# Patient Record
Sex: MALE | Race: WHITE | Employment: UNEMPLOYED | URBAN - METROPOLITAN AREA
[De-identification: names, ages, dates, MRNs, and addresses within clinical notes are randomized per-mention and may not be internally consistent; named-entity substitution may affect disease eponyms.]

---

## 2018-02-14 ENCOUNTER — TRANSFERRED RECORDS (OUTPATIENT)
Dept: HEALTH INFORMATION MANAGEMENT | Facility: CLINIC | Age: 23
End: 2018-02-14

## 2018-03-01 DIAGNOSIS — R94.5 ABNORMAL RESULTS OF LIVER FUNCTION STUDIES: Primary | ICD-10-CM

## 2018-03-06 ENCOUNTER — HOSPITAL ENCOUNTER (OUTPATIENT)
Facility: AMBULATORY SURGERY CENTER | Age: 23
End: 2018-03-06
Attending: INTERNAL MEDICINE
Payer: COMMERCIAL

## 2018-03-06 ENCOUNTER — OFFICE VISIT (OUTPATIENT)
Dept: GASTROENTEROLOGY | Facility: CLINIC | Age: 23
End: 2018-03-06
Attending: INTERNAL MEDICINE
Payer: COMMERCIAL

## 2018-03-06 VITALS
SYSTOLIC BLOOD PRESSURE: 139 MMHG | WEIGHT: 274.2 LBS | BODY MASS INDEX: 37.14 KG/M2 | HEART RATE: 77 BPM | DIASTOLIC BLOOD PRESSURE: 73 MMHG | TEMPERATURE: 98.4 F | HEIGHT: 72 IN

## 2018-03-06 DIAGNOSIS — R74.8 ELEVATED LIVER ENZYMES: Primary | ICD-10-CM

## 2018-03-06 DIAGNOSIS — R10.30 LOWER ABDOMINAL PAIN: ICD-10-CM

## 2018-03-06 DIAGNOSIS — R94.5 ABNORMAL RESULTS OF LIVER FUNCTION STUDIES: ICD-10-CM

## 2018-03-06 LAB
ALBUMIN SERPL-MCNC: 4.3 G/DL (ref 3.4–5)
ALP SERPL-CCNC: 76 U/L (ref 40–150)
ALT SERPL W P-5'-P-CCNC: 194 U/L (ref 0–70)
ANION GAP SERPL CALCULATED.3IONS-SCNC: 5 MMOL/L (ref 3–14)
AST SERPL W P-5'-P-CCNC: 97 U/L (ref 0–45)
BILIRUB DIRECT SERPL-MCNC: 0.1 MG/DL (ref 0–0.2)
BILIRUB SERPL-MCNC: 0.4 MG/DL (ref 0.2–1.3)
BUN SERPL-MCNC: 10 MG/DL (ref 7–30)
CALCIUM SERPL-MCNC: 9.6 MG/DL (ref 8.5–10.1)
CHLORIDE SERPL-SCNC: 104 MMOL/L (ref 94–109)
CO2 SERPL-SCNC: 29 MMOL/L (ref 20–32)
CREAT SERPL-MCNC: 0.89 MG/DL (ref 0.66–1.25)
ERYTHROCYTE [DISTWIDTH] IN BLOOD BY AUTOMATED COUNT: 12.6 % (ref 10–15)
GFR SERPL CREATININE-BSD FRML MDRD: >90 ML/MIN/1.7M2
GLUCOSE SERPL-MCNC: 96 MG/DL (ref 70–99)
HCT VFR BLD AUTO: 48.1 % (ref 40–53)
HGB BLD-MCNC: 16 G/DL (ref 13.3–17.7)
INR PPP: 0.99 (ref 0.86–1.14)
MCH RBC QN AUTO: 28.9 PG (ref 26.5–33)
MCHC RBC AUTO-ENTMCNC: 33.3 G/DL (ref 31.5–36.5)
MCV RBC AUTO: 87 FL (ref 78–100)
PLATELET # BLD AUTO: 255 10E9/L (ref 150–450)
POTASSIUM SERPL-SCNC: 4.3 MMOL/L (ref 3.4–5.3)
PROT SERPL-MCNC: 7.9 G/DL (ref 6.8–8.8)
RBC # BLD AUTO: 5.54 10E12/L (ref 4.4–5.9)
SODIUM SERPL-SCNC: 138 MMOL/L (ref 133–144)
WBC # BLD AUTO: 9.6 10E9/L (ref 4–11)

## 2018-03-06 PROCEDURE — 85610 PROTHROMBIN TIME: CPT | Performed by: INTERNAL MEDICINE

## 2018-03-06 PROCEDURE — 85027 COMPLETE CBC AUTOMATED: CPT | Performed by: INTERNAL MEDICINE

## 2018-03-06 PROCEDURE — 80048 BASIC METABOLIC PNL TOTAL CA: CPT | Performed by: INTERNAL MEDICINE

## 2018-03-06 PROCEDURE — G0463 HOSPITAL OUTPT CLINIC VISIT: HCPCS | Mod: ZF

## 2018-03-06 PROCEDURE — 80076 HEPATIC FUNCTION PANEL: CPT | Performed by: INTERNAL MEDICINE

## 2018-03-06 PROCEDURE — 36415 COLL VENOUS BLD VENIPUNCTURE: CPT | Performed by: INTERNAL MEDICINE

## 2018-03-06 ASSESSMENT — PAIN SCALES - GENERAL: PAINLEVEL: NO PAIN (0)

## 2018-03-06 NOTE — NURSING NOTE
"Chief Complaint   Patient presents with     Consult     consult with elevated lab results, tzimmer cma       Initial /73  Pulse 77  Temp 98.4  F (36.9  C) (Oral)  Ht 1.822 m (5' 11.75\")  Wt 124.4 kg (274 lb 3.2 oz)  BMI 37.45 kg/m2 Estimated body mass index is 37.45 kg/(m^2) as calculated from the following:    Height as of this encounter: 1.822 m (5' 11.75\").    Weight as of this encounter: 124.4 kg (274 lb 3.2 oz).  Medication Reconciliation: complete    "

## 2018-03-06 NOTE — LETTER
"3/6/2018       RE: Amari Disla  [address] [facility] [address]     Dear Colleague,    Thank you for referring your patient, Amari Disla, to the [facility] HEPATOLOGY at [facility]. Please see a copy of my visit note below.    Hepatology Clinic note  Amari Disla   Date of Birth 1995  Date of Service 3/6/2018  Reason for consult: Chronically elevated liver enzymes  Requesting provider: Dr. Carl Rueda, [facility]          Impression and plan:   Amari Disla is a 22 year old male with history of chronically elevated liver enzymes in a hepatocellular pattern, longstanding bowel symptoms including alternating bowel habits and abdominal pain.    He presents with his mother for evaluation of his liver enzymes abnormalities.      His enzyme elevation is in a hepatocellular pattern, but does have a normal synthetic function.    No clinical signs of advanced liver disease or portal hypertension.      Given the duration of these abnormalities, as well as the family history of Crohn's disease, we are suspecting an autoimmune etiology as the explanation of his abdominal symptoms and liver abnormalities.  Of note, he had a positive aldolase level in the past, as well as the unusual oral thrush recently.      - Will evaluate him for causes of chronic liver disease including autoimmune entities such as autoimmune hepatitis, PBC or PSC.   - Checking ceruloplasmin, iron panel, immunoglobulins, SPEP, celiac serologies and viral serologies.   - Ordered MRI, MRCP for better evaluation of the liver parenchyma and biliary tree.  Of note, his prior imaging was suggestive of \"fatty infiltration.\"   - Depending on the results of the above, we may obtain a liver biopsy for definitive diagnosis and evaluation of fibrosis given the chronicity.   - Would also suggest a repeat colonoscopy and an EGD with biopsies, as he continues to have abdominal symptoms.  His last colonoscopy " was in 2012.      Return to clinic in 3 months or sooner as needed    Lise Boss MD  [facility] Hepat[facility]    -----------------------------------------------------       HPI:   Amari Disla is a 22 year old male history of chronic abdominal symptoms labeled as irritable bowel syndrome, cholecystectomy and appendectomy in 2012 and chronically elevated liver enzymes, who presents with his mother to establish care.      Briefly, Amari reports a longstanding history of alternating bowel habits, predominantly loose stool on a daily basis associated with lower pelvic cramps and discomfort which are relieved with defecation.  This is typically worse about 30 minutes after a meal.  He denies any hematochezia or melena.      Had a colonoscopy in 2012 after a CT scan showed abnormal appearance of the terminal ileum, colonoscopy was reportedly normal.  Biopsies from the terminal ileum, cecum and left and right-sided colon where normal without pathological alterations.   In addition, his mother reports a longstanding history of different symptoms including intermittent fevers, facial flushing and some weakness.  More recently, he was started on rifaximin empirically for small intestinal bacterial overgrowth, and he reports significant improvement after a 10-day course of the medication.      His mother notes  numerous infections as a child related to sinusitis and middle ear infections requiring multiple rounds of antibiotics.  However, his symptoms improved after he had tube placement.      In 12/2017, he was diagnosed with oral thrush which responded to Nystatin treatment.  There was no clear explantation as to why he developed thrush.  Blood tests at that time showed rising liver enzymes, predominantly hepatocellular.      Amari denies significant alcohol use, no over-the-counter medications, herbs or supplements.  He notes stable weight and appetite.  He is not on any prescribed  "medications and only takes probiotics.      He has a paternal uncle with Crohn's disease.  Maternal side is significant for an uncle with multiple myeloma, a cousin with leukemia, another uncle with testicular cancer as well as diabetes.          Past Medical History:     Past Medical History:   Diagnosis Date     Abdominal pain      Anxiety             Past Surgical History:     Past Surgical History:   Procedure Laterality Date     APPENDECTOMY       CHOLECYSTECTOMY       PE TUBES       TONSILLECTOMY              Medications:     Current Outpatient Prescriptions   Medication     Lactobacillus (PROBIOTIC ACIDOPHILUS PO)     No current facility-administered medications for this visit.           Allergies:   No Known Allergies         Social History:   reports that he is a non-smoker but has been exposed to tobacco smoke. He has never used smokeless tobacco. He reports that he does not drink alcohol or use illicit drugs.   has no sexual activity history on file.         Family History:   Paternal uncle with Crohn's disease.   Maternal side is significant for an uncle with multiple myeloma, a cousin with leukemia, another uncle with testicular cancer as well as diabetes.          Review of Systems:   10 points ROS was obtained and highlighted in the HPI, otherwise negative.          Physical Exam:   VS:  /73  Pulse 77  Temp 98.4  F (36.9  C) (Oral)  Ht 1.822 m (5' 11.75\")  Wt 124.4 kg (274 lb 3.2 oz)  BMI 37.45 kg/m2      Gen: A&Ox3, NAD  HEENT: non-icteric, oropharynx clear  CV: RRR  Lung: CTAB  Abd: soft, NT, ND  Ext: no edema, intact pulses.   Skin: no stigmata of chronic liver disease.   Neuro: no focal deficits, grossly intact, no asterixis   Psych: appropriate mood and affects       Data:   Reviewed in person and significant for:  Lab Results   Component Value Date    WBC 9.6 03/06/2018     Lab Results   Component Value Date    RBC 5.54 03/06/2018     Lab Results   Component Value Date    HGB 16.0 " 03/06/2018     Lab Results   Component Value Date    HCT 48.1 03/06/2018     No components found for: MCT  Lab Results   Component Value Date    MCV 87 03/06/2018     Lab Results   Component Value Date    MCH 28.9 03/06/2018     Lab Results   Component Value Date    MCHC 33.3 03/06/2018     Lab Results   Component Value Date    RDW 12.6 03/06/2018     Lab Results   Component Value Date     03/06/2018     Last Basic Metabolic Panel:  Lab Results   Component Value Date     03/06/2018      Lab Results   Component Value Date    POTASSIUM 4.3 03/06/2018     Lab Results   Component Value Date    CHLORIDE 104 03/06/2018     Lab Results   Component Value Date    STEVE 9.6 03/06/2018     Lab Results   Component Value Date    CO2 29 03/06/2018     Lab Results   Component Value Date    BUN 10 03/06/2018     Lab Results   Component Value Date    CR 0.89 03/06/2018     Lab Results   Component Value Date    GLC 96 03/06/2018     Liver Function Studies -   Recent Labs   Lab Test  03/06/18   0717   PROTTOTAL  7.9   ALBUMIN  4.3   BILITOTAL  0.4   ALKPHOS  76   AST  97*   ALT  194*   Again, thank you for allowing me to participate in the care of your patient.    Lise Boss MD

## 2018-03-06 NOTE — MR AVS SNAPSHOT
After Visit Summary   3/6/2018    Amari Disla    MRN: [id number]           Patient Information     Date Of Birth          1995        Visit Information        Provider Department      3/6/2018 9:00 AM Lise Boss MD [facility] Hepatology        Today's Diagnoses     Elevated liver enzymes    -  1    Lower abdominal pain           Follow-ups after your visit        Additional Services     GASTROENTEROLOGY ADULT REF PROCEDURE ONLY       Last Lab Result: Creatinine (mg/dL)       Date                     Value                 03/06/2018               0.89             ----------  Body mass index is 37.45 kg/(m^2).     Needed:  No  Language:  English    Patient will be contacted to schedule procedure.     Please be aware that coverage of these services is subject to the terms and limitations of your health insurance plan.  Call member services at your health plan with any benefit or coverage questions.  Any procedures must be performed at a [facility] facility OR coordinated by your clinic's referral office.    Please bring the following with you to your appointment:    (1) Any X-Rays, CTs or MRIs which have been performed.  Contact the facility where they were done to arrange for  prior to your scheduled appointment.    (2) List of current medications   (3) This referral request   (4) Any documents/labs given to you for this referral                  Your next 10 appointments already scheduled     Mar 28, 2018   Procedure with Lise Boss MD   [facility] ([facility])    [address] [place], [place] [id number] [phone]           Located in the Clinics and Surgery Center at [address] [place] [address].   parking is very convenient and highly recommended.  is a $6 flat rate fee.  Both  and self parkers should enter the main arrival plaza from [facility]; parking attendants will  "direct you based on your parking preference.            Jun 11, 2018  2:30 PM CDT   Lab with [facility]   [facility] ([facility])    [address]  1st Floor  [facility] [address] [phone]            Jun 11, 2018  3:30 PM CDT   (Arrive by 3:15 PM)   Return General Liver with Lise Boss MD   [facility] ([facility])    [address]  [address]  [facility] [address] [phone]              Who to contact     If you have questions or need follow up information about today's clinic visit or your schedule please contact [facility] directly at [phone].  Normal or non-critical lab and imaging results will be communicated to you by MyChart, letter or phone within 4 business days after the clinic has received the results. If you do not hear from us within 7 days, please contact the clinic through Tel[employer] or phone. If you have a critical or abnormal lab result, we will notify you by phone as soon as possible.  Submit refill requests through Ch[employer]time or call your pharmacy and they will forward the refill request to us. Please allow 3 business days for your refill to be completed.          Additional Information About Your Visit        Chilltime Information     Ch[employer]time gives you secure access to your electronic health record. If you see a primary care provider, you can also send messages to your care team and make appointments. If you have questions, please call your primary care clinic.  If you do not have a primary care provider, please call [phone] and they will assist you.        Care EveryWhere ID     This is your Care EveryWhere ID. This could be used by other organizations to access your [place] medical records  [id number]        Your Vitals Were     Pulse Temperature Height BMI (Body Mass Index)          77 98.4  F (36.9  C) (Oral) 1.822 m (5' 11.75\") 37.45 kg/m2         Blood Pressure from Last 3 Encounters: "   03/06/18 139/73   12/26/15 111/72   03/11/10 106/58    Weight from Last 3 Encounters:   03/06/18 124.4 kg (274 lb 3.2 oz)   03/11/10 55.3 kg (122 lb) (84 %)*   02/02/10 54 kg (119 lb) (82 %)*     * Growth percentiles are based on [facility] 2-20 Years data.              We Performed the Following     GASTROENTEROLOGY ADULT REF PROCEDURE ONLY          Today's Medication Changes          These changes are accurate as of 3/6/18 11:59 PM.  If you have any questions, ask your nurse or doctor.               Stop taking these medicines if you haven't already. Please contact your care team if you have questions.     SERTRALINE HCL PO   Stopped by:  Lise Boss MD                    Primary Care Provider Office Phone # Fax #    [facility] [phone]       [facility] [address] [place] [id number]        Equal Access to Services     [facility]: Hadii ronny ku hadasho Soomaali, waaxda luqadaha, qaybta kaalmada adeegyada, brady sher . So [facility] [phone].    ATENCIÓN: Si habla español, tiene a banda disposición servicios gratuitos de asistencia lingüística. Llame al [phone].    We comply with applicable federal civil rights laws and [place] laws. We do not discriminate on the basis of race, color, national origin, age, disability, sex, sexual orientation, or gender identity.            Thank you!     Thank you for choosing [facility]PATOLOG[facility]  for your care. Our goal is always to provide you with excellent care. Hearing back from our patients is one way we can continue to improve our services. Please take a few minutes to complete the written survey that you may receive in the mail after your visit with us. Thank you!             Your Updated Medication List - Protect others around you: Learn how to safely use, store and throw away your medicines at www.disposemymeds.org.          This list is accurate as of 3/6/18 11:59 PM.  Always use your most  recent med list.                   Brand Name Dispense Instructions for use Diagnosis    PROBIOTIC ACIDOPHILUS PO      Take by mouth daily

## 2018-03-06 NOTE — PROGRESS NOTES
"Hepatology Clinic note  Amari Disla   Date of Birth 1995  Date of Service 3/6/2018  Reason for consult: Chronically elevated liver enzymes  Requesting provider: Dr. Carl Rueda, [facility]          Impression and plan:   Amari Disla is a 22 year old male with history of chronically elevated liver enzymes in a hepatocellular pattern, longstanding bowel symptoms including alternating bowel habits and abdominal pain.    He presents with his mother for evaluation of his liver enzymes abnormalities.      His enzyme elevation is in a hepatocellular pattern, but does have a normal synthetic function.    No clinical signs of advanced liver disease or portal hypertension.      Given the duration of these abnormalities, as well as the family history of Crohn's disease, we are suspecting an autoimmune etiology as the explanation of his abdominal symptoms and liver abnormalities.  Of note, he had a positive aldolase level in the past, as well as the unusual oral thrush recently.      - Will evaluate him for causes of chronic liver disease including autoimmune entities such as autoimmune hepatitis, PBC or PSC.   - Checking ceruloplasmin, iron panel, immunoglobulins, SPEP, celiac serologies and viral serologies.   - Ordered MRI, MRCP for better evaluation of the liver parenchyma and biliary tree.  Of note, his prior imaging was suggestive of \"fatty infiltration.\"   - Depending on the results of the above, we may obtain a liver biopsy for definitive diagnosis and evaluation of fibrosis given the chronicity.   - Would also suggest a repeat colonoscopy and an EGD with biopsies, as he continues to have abdominal symptoms.  His last colonoscopy was in 2012.      Return to clinic in 3 months or sooner as needed    Lise Boss MD  [facility] Hepatology clinic    -----------------------------------------------------       HPI:   Amari Disla is a 22 year old male history of chronic abdominal " symptoms labeled as irritable bowel syndrome, cholecystectomy and appendectomy in 2012 and chronically elevated liver enzymes, who presents with his mother to establish care.      Briefly, Amari reports a longstanding history of alternating bowel habits, predominantly loose stool on a daily basis associated with lower pelvic cramps and discomfort which are relieved with defecation.  This is typically worse about 30 minutes after a meal.  He denies any hematochezia or melena.      Had a colonoscopy in 2012 after a CT scan showed abnormal appearance of the terminal ileum, colonoscopy was reportedly normal.  Biopsies from the terminal ileum, cecum and left and right-sided colon where normal without pathological alterations.   In addition, his mother reports a longstanding history of different symptoms including intermittent fevers, facial flushing and some weakness.  More recently, he was started on rifaximin empirically for small intestinal bacterial overgrowth, and he reports significant improvement after a 10-day course of the medication.      His mother notes  numerous infections as a child related to sinusitis and middle ear infections requiring multiple rounds of antibiotics.  However, his symptoms improved after he had tube placement.      In 12/2017, he was diagnosed with oral thrush which responded to Nystatin treatment.  There was no clear explantation as to why he developed thrush.  Blood tests at that time showed rising liver enzymes, predominantly hepatocellular.      Amari denies significant alcohol use, no over-the-counter medications, herbs or supplements.  He notes stable weight and appetite.  He is not on any prescribed medications and only takes probiotics.      He has a paternal uncle with Crohn's disease.  Maternal side is significant for an uncle with multiple myeloma, a cousin with leukemia, another uncle with testicular cancer as well as diabetes.          Past Medical History:     Past  "Medical History:   Diagnosis Date     Abdominal pain      Anxiety             Past Surgical History:     Past Surgical History:   Procedure Laterality Date     APPENDECTOMY       CHOLECYSTECTOMY       PE TUBES       TONSILLECTOMY              Medications:     Current Outpatient Prescriptions   Medication     Lactobacillus (PROBIOTIC ACIDOPHILUS PO)     No current facility-administered medications for this visit.             Allergies:   No Known Allergies         Social History:      reports that he is a non-smoker but has been exposed to tobacco smoke. He has never used smokeless tobacco. He reports that he does not drink alcohol or use illicit drugs.   has no sexual activity history on file.           Family History:   Paternal uncle with Crohn's disease.   Maternal side is significant for an uncle with multiple myeloma, a cousin with leukemia, another uncle with testicular cancer as well as diabetes.          Review of Systems:   10 points ROS was obtained and highlighted in the HPI, otherwise negative.          Physical Exam:   VS:  /73  Pulse 77  Temp 98.4  F (36.9  C) (Oral)  Ht 1.822 m (5' 11.75\")  Wt 124.4 kg (274 lb 3.2 oz)  BMI 37.45 kg/m2      Gen: A&Ox3, NAD  HEENT: non-icteric, oropharynx clear  CV: RRR  Lung: CTAB  Abd: soft, NT, ND  Ext: no edema, intact pulses.   Skin: no stigmata of chronic liver disease.   Neuro: no focal deficits, grossly intact, no asterixis   Psych: appropriate mood and affects       Data:   Reviewed in person and significant for:  Lab Results   Component Value Date    WBC 9.6 03/06/2018     Lab Results   Component Value Date    RBC 5.54 03/06/2018     Lab Results   Component Value Date    HGB 16.0 03/06/2018     Lab Results   Component Value Date    HCT 48.1 03/06/2018     No components found for: MCT  Lab Results   Component Value Date    MCV 87 03/06/2018     Lab Results   Component Value Date    MCH 28.9 03/06/2018     Lab Results   Component Value Date    MCHC " 33.3 03/06/2018     Lab Results   Component Value Date    RDW 12.6 03/06/2018     Lab Results   Component Value Date     03/06/2018     Last Basic Metabolic Panel:  Lab Results   Component Value Date     03/06/2018      Lab Results   Component Value Date    POTASSIUM 4.3 03/06/2018     Lab Results   Component Value Date    CHLORIDE 104 03/06/2018     Lab Results   Component Value Date    STEVE 9.6 03/06/2018     Lab Results   Component Value Date    CO2 29 03/06/2018     Lab Results   Component Value Date    BUN 10 03/06/2018     Lab Results   Component Value Date    CR 0.89 03/06/2018     Lab Results   Component Value Date    GLC 96 03/06/2018     Liver Function Studies -   Recent Labs   Lab Test  03/06/18   0717   PROTTOTAL  7.9   ALBUMIN  4.3   BILITOTAL  0.4   ALKPHOS  76   AST  97*   ALT  194*

## 2018-03-08 ENCOUNTER — TELEPHONE (OUTPATIENT)
Dept: GASTROENTEROLOGY | Facility: CLINIC | Age: 23
End: 2018-03-08

## 2018-03-08 NOTE — TELEPHONE ENCOUNTER
Writer spoke to pt. Pr prefers to have the MRI and lab studies(faxed lab orders to [phone]; [phone]), performed @ the [facility]. Scheduled MRI/MRCP for tomorrow @ 7:15am check in, NPO 4 hours prior. Faxed order to [phone]; [phone].  EGD/colonoscopy is scheduled @ the [facility]. Pt was able to confirm all that was needed. Transferred pt to appt scheduling.

## 2018-03-08 NOTE — TELEPHONE ENCOUNTER
----- Message from Lise Boss MD sent at 3/6/2018 12:06 PM CST -----  Shaggy Montes,   I saw Amari this morning. I discharged him before placing orders because I needed to review his outside workup and history.     Would you please update him that I recommend the following at his convenience (time and location):  - blood, urine and stool samples. Tests ordered.   - MRI/MRCP, ordered.  - colonoscopy, and EGD. I did not mention the EGD to him earlier, but I believe it would be helpful to check at the time of colonoscopy.   Both are ordered.   - depending on results of all of the above, we may or may not need a liver biopsy, study tuned.     - I suggest he sets up a return appointment with me in 3-4 months (needs to schedule it).    Thank you

## 2018-03-09 ENCOUNTER — TRANSFERRED RECORDS (OUTPATIENT)
Dept: HEALTH INFORMATION MANAGEMENT | Facility: CLINIC | Age: 23
End: 2018-03-09

## 2018-03-09 DIAGNOSIS — R10.30 LOWER ABDOMINAL PAIN: ICD-10-CM

## 2018-03-09 DIAGNOSIS — R74.8 ELEVATED LIVER ENZYMES: ICD-10-CM

## 2018-03-09 LAB
ALBUMIN UR-MCNC: NEGATIVE MG/DL
APPEARANCE UR: CLEAR
BILIRUB UR QL STRIP: NEGATIVE
COLOR UR AUTO: YELLOW
CRP SERPL-MCNC: 3.9 MG/L (ref 0–8)
ERYTHROCYTE [SEDIMENTATION RATE] IN BLOOD BY WESTERGREN METHOD: 7 MM/H (ref 0–15)
FERRITIN SERPL-MCNC: 96 NG/ML (ref 26–388)
GLUCOSE UR STRIP-MCNC: NEGATIVE MG/DL
HGB UR QL STRIP: NEGATIVE
IGA SERPL-MCNC: 72 MG/DL (ref 70–380)
IGG SERPL-MCNC: 1020 MG/DL (ref 695–1620)
IGM SERPL-MCNC: 64 MG/DL (ref 60–265)
IRON SATN MFR SERPL: 19 % (ref 15–46)
IRON SERPL-MCNC: 84 UG/DL (ref 35–180)
KETONES UR STRIP-MCNC: NEGATIVE MG/DL
LEUKOCYTE ESTERASE UR QL STRIP: NEGATIVE
NITRATE UR QL: NEGATIVE
PH UR STRIP: 5.5 PH (ref 5–7)
RBC #/AREA URNS AUTO: NORMAL /HPF
SOURCE: NORMAL
SP GR UR STRIP: 1.02 (ref 1–1.03)
TIBC SERPL-MCNC: 439 UG/DL (ref 240–430)
UROBILINOGEN UR STRIP-MCNC: NORMAL MG/DL (ref 0–2)
WBC #/AREA URNS AUTO: NORMAL /HPF

## 2018-03-09 PROCEDURE — 82390 ASSAY OF CERULOPLASMIN: CPT | Performed by: INTERNAL MEDICINE

## 2018-03-09 PROCEDURE — 83550 IRON BINDING TEST: CPT | Performed by: INTERNAL MEDICINE

## 2018-03-09 PROCEDURE — 36415 COLL VENOUS BLD VENIPUNCTURE: CPT | Performed by: INTERNAL MEDICINE

## 2018-03-09 PROCEDURE — 84165 PROTEIN E-PHORESIS SERUM: CPT | Performed by: INTERNAL MEDICINE

## 2018-03-09 PROCEDURE — 00000402 ZZHCL STATISTIC TOTAL PROTEIN: Performed by: INTERNAL MEDICINE

## 2018-03-09 PROCEDURE — 81001 URINALYSIS AUTO W/SCOPE: CPT | Performed by: INTERNAL MEDICINE

## 2018-03-09 PROCEDURE — 99000 SPECIMEN HANDLING OFFICE-LAB: CPT | Performed by: INTERNAL MEDICINE

## 2018-03-09 PROCEDURE — 83516 IMMUNOASSAY NONANTIBODY: CPT | Mod: 90 | Performed by: INTERNAL MEDICINE

## 2018-03-09 PROCEDURE — 82103 ALPHA-1-ANTITRYPSIN TOTAL: CPT | Performed by: INTERNAL MEDICINE

## 2018-03-09 PROCEDURE — 83993 ASSAY FOR CALPROTECTIN FECAL: CPT | Performed by: INTERNAL MEDICINE

## 2018-03-09 PROCEDURE — 82784 ASSAY IGA/IGD/IGG/IGM EACH: CPT | Performed by: INTERNAL MEDICINE

## 2018-03-09 PROCEDURE — 83516 IMMUNOASSAY NONANTIBODY: CPT | Mod: 59 | Performed by: INTERNAL MEDICINE

## 2018-03-09 PROCEDURE — 82728 ASSAY OF FERRITIN: CPT | Performed by: INTERNAL MEDICINE

## 2018-03-09 PROCEDURE — 86140 C-REACTIVE PROTEIN: CPT | Performed by: INTERNAL MEDICINE

## 2018-03-09 PROCEDURE — 83540 ASSAY OF IRON: CPT | Performed by: INTERNAL MEDICINE

## 2018-03-09 PROCEDURE — 85652 RBC SED RATE AUTOMATED: CPT | Performed by: INTERNAL MEDICINE

## 2018-03-09 PROCEDURE — 82085 ASSAY OF ALDOLASE: CPT | Mod: 90 | Performed by: INTERNAL MEDICINE

## 2018-03-10 ENCOUNTER — HEALTH MAINTENANCE LETTER (OUTPATIENT)
Age: 23
End: 2018-03-10

## 2018-03-10 LAB
ALDOLASE SERPL-CCNC: 8.2 U/L (ref 1.5–8.1)
MITOCHONDRIA M2 IGG SER-ACNC: 1 U/ML
TTG IGA SER-ACNC: <1 U/ML
TTG IGG SER-ACNC: <1 U/ML

## 2018-03-11 LAB — SMA IGG SER-ACNC: 8 UNITS (ref 0–19)

## 2018-03-12 LAB
A1AT SERPL-MCNC: 153 MG/DL (ref 80–200)
ALBUMIN SERPL ELPH-MCNC: 4.7 G/DL (ref 3.7–5.1)
ALPHA1 GLOB SERPL ELPH-MCNC: 0.3 G/DL (ref 0.2–0.4)
ALPHA2 GLOB SERPL ELPH-MCNC: 0.8 G/DL (ref 0.5–0.9)
B-GLOBULIN SERPL ELPH-MCNC: 0.9 G/DL (ref 0.6–1)
CERULOPLASMIN SERPL-MCNC: 34 MG/DL (ref 23–53)
GAMMA GLOB SERPL ELPH-MCNC: 1 G/DL (ref 0.7–1.6)
M PROTEIN SERPL ELPH-MCNC: 0 G/DL
PROT PATTERN SERPL ELPH-IMP: NORMAL

## 2018-03-13 LAB — CALPROTECTIN STL-MCNT: <27 MG/KG (ref 0–49.9)

## 2018-03-21 ENCOUNTER — TELEPHONE (OUTPATIENT)
Dept: GASTROENTEROLOGY | Facility: CLINIC | Age: 23
End: 2018-03-21

## 2018-03-23 ENCOUNTER — TELEPHONE (OUTPATIENT)
Dept: GASTROENTEROLOGY | Facility: CLINIC | Age: 23
End: 2018-03-23

## 2018-03-23 DIAGNOSIS — Z12.11 ENCOUNTER FOR SCREENING COLONOSCOPY: Primary | ICD-10-CM

## 2018-03-23 NOTE — TELEPHONE ENCOUNTER
Patient scheduled for Colonoscopy and EGD    Indication for procedure. Lower abdominal pain     Referring Provider. Lise Boss MD    ? No    Arrival time verified? Patient instructed to arrive at 1100 am.    Facility location verified? [facility], [address] [place] [address] 5th floor.     Instructions given regarding prep and procedure. Transportation policy and prep reviewed; RN answered all questions.     Prep Type Golytely    Are you taking any anticoagulants or blood thinners? Denies     Instructions given? Yes, verbalized understanding     Electronic implanted devices? Denies     Pre procedure teaching completed? Yes    Transportation from procedure? Mother with drive and stay with patient.     H&P / Pre op physical completed? N/A    Omar Hager RN

## 2018-04-06 ENCOUNTER — TELEPHONE (OUTPATIENT)
Dept: GASTROENTEROLOGY | Facility: CLINIC | Age: 23
End: 2018-04-06

## 2018-04-06 DIAGNOSIS — Z12.11 ENCOUNTER FOR SCREENING COLONOSCOPY: Primary | ICD-10-CM

## 2018-04-12 ENCOUNTER — ANESTHESIA (OUTPATIENT)
Dept: GASTROENTEROLOGY | Facility: CLINIC | Age: 23
End: 2018-04-12
Payer: COMMERCIAL

## 2018-04-12 ENCOUNTER — ANESTHESIA EVENT (OUTPATIENT)
Dept: GASTROENTEROLOGY | Facility: CLINIC | Age: 23
End: 2018-04-12
Payer: COMMERCIAL

## 2018-04-12 ENCOUNTER — SURGERY (OUTPATIENT)
Age: 23
End: 2018-04-12

## 2018-04-12 ENCOUNTER — HOSPITAL ENCOUNTER (OUTPATIENT)
Facility: CLINIC | Age: 23
Discharge: HOME | End: 2018-04-12
Attending: INTERNAL MEDICINE | Admitting: INTERNAL MEDICINE
Payer: COMMERCIAL

## 2018-04-12 VITALS
WEIGHT: 277 LBS | BODY MASS INDEX: 38.78 KG/M2 | OXYGEN SATURATION: 96 % | HEART RATE: 95 BPM | DIASTOLIC BLOOD PRESSURE: 85 MMHG | SYSTOLIC BLOOD PRESSURE: 132 MMHG | RESPIRATION RATE: 15 BRPM | HEIGHT: 71 IN

## 2018-04-12 LAB
COLONOSCOPY: NORMAL
UPPER GI ENDOSCOPY: NORMAL

## 2018-04-12 PROCEDURE — 25000128 H RX IP 250 OP 636: Performed by: NURSE ANESTHETIST, CERTIFIED REGISTERED

## 2018-04-12 PROCEDURE — 40000141 ZZH STATISTIC PERIPHERAL IV START W/O US GUIDANCE

## 2018-04-12 PROCEDURE — 88305 TISSUE EXAM BY PATHOLOGIST: CPT | Performed by: INTERNAL MEDICINE

## 2018-04-12 PROCEDURE — 37000009 ZZH ANESTHESIA TECHNICAL FEE, EACH ADDTL 15 MIN: Performed by: INTERNAL MEDICINE

## 2018-04-12 PROCEDURE — 37000008 ZZH ANESTHESIA TECHNICAL FEE, 1ST 30 MIN: Performed by: INTERNAL MEDICINE

## 2018-04-12 PROCEDURE — 25000125 ZZHC RX 250: Performed by: NURSE ANESTHETIST, CERTIFIED REGISTERED

## 2018-04-12 PROCEDURE — 43239 EGD BIOPSY SINGLE/MULTIPLE: CPT | Performed by: INTERNAL MEDICINE

## 2018-04-12 PROCEDURE — 45380 COLONOSCOPY AND BIOPSY: CPT | Performed by: INTERNAL MEDICINE

## 2018-04-12 RX ORDER — PROPOFOL 10 MG/ML
INJECTION, EMULSION INTRAVENOUS PRN
Status: DISCONTINUED | OUTPATIENT
Start: 2018-04-12 | End: 2018-04-12

## 2018-04-12 RX ORDER — FENTANYL CITRATE 50 UG/ML
25-50 INJECTION, SOLUTION INTRAMUSCULAR; INTRAVENOUS
Status: CANCELLED | OUTPATIENT
Start: 2018-04-12

## 2018-04-12 RX ORDER — ONDANSETRON 4 MG/1
4 TABLET, ORALLY DISINTEGRATING ORAL EVERY 30 MIN PRN
Status: CANCELLED | OUTPATIENT
Start: 2018-04-12

## 2018-04-12 RX ORDER — PROPOFOL 10 MG/ML
INJECTION, EMULSION INTRAVENOUS CONTINUOUS PRN
Status: DISCONTINUED | OUTPATIENT
Start: 2018-04-12 | End: 2018-04-12

## 2018-04-12 RX ORDER — NALOXONE HYDROCHLORIDE 0.4 MG/ML
.1-.4 INJECTION, SOLUTION INTRAMUSCULAR; INTRAVENOUS; SUBCUTANEOUS
Status: CANCELLED | OUTPATIENT
Start: 2018-04-12 | End: 2018-04-13

## 2018-04-12 RX ORDER — ONDANSETRON 2 MG/ML
4 INJECTION INTRAMUSCULAR; INTRAVENOUS EVERY 30 MIN PRN
Status: CANCELLED | OUTPATIENT
Start: 2018-04-12

## 2018-04-12 RX ORDER — SODIUM CHLORIDE, SODIUM LACTATE, POTASSIUM CHLORIDE, CALCIUM CHLORIDE 600; 310; 30; 20 MG/100ML; MG/100ML; MG/100ML; MG/100ML
INJECTION, SOLUTION INTRAVENOUS CONTINUOUS PRN
Status: DISCONTINUED | OUTPATIENT
Start: 2018-04-12 | End: 2018-04-12

## 2018-04-12 RX ORDER — FENTANYL CITRATE 50 UG/ML
INJECTION, SOLUTION INTRAMUSCULAR; INTRAVENOUS PRN
Status: DISCONTINUED | OUTPATIENT
Start: 2018-04-12 | End: 2018-04-12

## 2018-04-12 RX ORDER — SODIUM CHLORIDE, SODIUM LACTATE, POTASSIUM CHLORIDE, CALCIUM CHLORIDE 600; 310; 30; 20 MG/100ML; MG/100ML; MG/100ML; MG/100ML
INJECTION, SOLUTION INTRAVENOUS CONTINUOUS
Status: CANCELLED | OUTPATIENT
Start: 2018-04-12

## 2018-04-12 RX ADMIN — PROPOFOL 100 MCG/KG/MIN: 10 INJECTION, EMULSION INTRAVENOUS at 14:01

## 2018-04-12 RX ADMIN — PROPOFOL 50 MG: 10 INJECTION, EMULSION INTRAVENOUS at 14:09

## 2018-04-12 RX ADMIN — MIDAZOLAM 1 MG: 1 INJECTION INTRAMUSCULAR; INTRAVENOUS at 13:56

## 2018-04-12 RX ADMIN — LIDOCAINE HYDROCHLORIDE 5 ML: 20 SOLUTION ORAL; TOPICAL at 13:56

## 2018-04-12 RX ADMIN — PROPOFOL 30 MG: 10 INJECTION, EMULSION INTRAVENOUS at 14:25

## 2018-04-12 RX ADMIN — SODIUM CHLORIDE, POTASSIUM CHLORIDE, SODIUM LACTATE AND CALCIUM CHLORIDE: 600; 310; 30; 20 INJECTION, SOLUTION INTRAVENOUS at 14:01

## 2018-04-12 RX ADMIN — BENZOCAINE 1 EACH: 220 SPRAY, METERED PERIODONTAL at 13:56

## 2018-04-12 RX ADMIN — FENTANYL CITRATE 50 MCG: 50 INJECTION, SOLUTION INTRAMUSCULAR; INTRAVENOUS at 14:05

## 2018-04-12 RX ADMIN — MIDAZOLAM 1 MG: 1 INJECTION INTRAMUSCULAR; INTRAVENOUS at 14:01

## 2018-04-12 NOTE — IP AVS SNAPSHOT
[facility], [place], Endoscopy    [address] [address]    Phone:  [phone]                                       After Visit Summary   4/12/2018    Amari Disla    MRN: [id number]           After Visit Summary Signature Page     I have received my discharge instructions, and my questions have been answered. I have discussed any challenges I see with this plan with the nurse or doctor.    ..........................................................................................................................................  Patient/Patient Representative Signature      ..........................................................................................................................................  Patient Representative Print Name and Relationship to Patient    ..................................................               ................................................  Date                                            Time    ..........................................................................................................................................  Reviewed by Signature/Title    ...................................................              ..............................................  Date                                                            Time

## 2018-04-12 NOTE — LETTER
4/17/2018     Amari Disla  [address] [facility] [address]      Dear Amari:    I received the pathology report from the biopsies taken at your recent endoscopies.    The small bowel/duodenum biopsies showed some mild inflammation related to acid from the stomach.    The biopsies taken from the small bowel/ileum and the entirety of the large bowel were all normal.  There is no evidence of cancer or Crohn's disease.    For the inflammation seen on the duodenum biopsies, you should take a course of acid suppression medication (omeprazole).  I will send a prescription of this to your local pharmacy.    You should follow-up with Dr Boss in the office as scheduled.    Sincerely,          Clifton Juan MD  [facility], [place], ENDOSCOPY  [address] [facility] [address]  Phone: [phone]

## 2018-04-12 NOTE — ANESTHESIA PREPROCEDURE EVALUATION
Anesthesia Evaluation     .             ROS/MED HX    ENT/Pulmonary:  - neg pulmonary ROS     Neurologic:  - neg neurologic ROS     Cardiovascular:  - neg cardiovascular ROS       METS/Exercise Tolerance:     Hematologic:  - neg hematologic  ROS       Musculoskeletal:  - neg musculoskeletal ROS       GI/Hepatic: Comment: Abdominal pain  Elevated liver enzymes        Renal/Genitourinary:  - ROS Renal section negative       Endo:  - neg endo ROS       Psychiatric:     (+) psychiatric history depression and anxiety      Infectious Disease:  - neg infectious disease ROS       Malignancy:         Other:                     Physical Exam  Normal systems: cardiovascular, pulmonary and dental    Airway   Mallampati: I  TM distance: >3 FB  Neck ROM: full    Dental     Cardiovascular   Rhythm and rate: regular and normal      Pulmonary    breath sounds clear to auscultation                    Anesthesia Plan      History & Physical Review  History and physical reviewed and following examination; no interval change.    ASA Status:  1 .    NPO Status:  > 8 hours    Plan for MAC with Intravenous and Propofol induction. Maintenance will be TIVA.  Reason for MAC:  Deep or markedly invasive procedure (G8)  PONV prophylaxis:  Ondansetron (or other 5HT-3) and Dexamethasone or Solumedrol       Postoperative Care  Postoperative pain management:  IV analgesics.      Consents  Anesthetic plan, risks, benefits and alternatives discussed with:  Patient..

## 2018-04-12 NOTE — ANESTHESIA CARE TRANSFER NOTE
Patient: Amari Disla    Procedure(s):   - Wound Class: II-Clean Contaminated   - Wound Class: II-Clean Contaminated    Diagnosis: Colon & Egd with Mahgoub/DX:Lower abdominal pain /Golytely & egd preps mailed  Diagnosis Additional Information: No value filed.    Anesthesia Type:   MAC     Note:  Airway :Room Air  Patient transferred to:Phase II  Comments: Pt to recovery room.  Spontaneous respirations.   Report given to RN.  Handoff Report: Identifed the Patient, Identified the Reponsible Provider, Reviewed the pertinent medical history, Discussed the surgical course, Reviewed Intra-OP anesthesia mangement and issues during anesthesia, Set expectations for post-procedure period and Allowed opportunity for questions and acknowledgement of understanding      Vitals: (Last set prior to Anesthesia Care Transfer)    CRNA VITALS  4/12/2018 1415 - 4/12/2018 1515      4/12/2018             Pulse: 84    Ht Rate: 90    SpO2: 95 %    Resp Rate (observed): 16    EKG: Sinus rhythm                Electronically Signed By: SURYA Vincent CRNA  April 12, 2018  3:21 PM

## 2018-04-12 NOTE — IP AVS SNAPSHOT
MRN:[id number]                      After Visit Summary   4/12/2018    Amari Disla    MRN: [id number]           Thank you!     Thank you for choosing [facility] for your care. Our goal is always to provide you with excellent care. Hearing back from our patients is one way we can continue to improve our services. Please take a few minutes to complete the written survey that you may receive in the mail after you visit with us. Thank you!        Patient Information     Date Of Birth          1995        About your hospital stay     You were admitted on:  April 12, 2018 You last received care in the:  [facility], Endoscopy    You were discharged on:  April 12, 2018       Who to Call     For medical emergencies, please call 911.  For non-urgent questions about your medical care, please call your primary care provider or clinic, [phone]  For questions related to your surgery, please call your surgery clinic        Attending Provider     Provider Specialty    Clifton Chaparro MD Gastroenterology       Primary Care Provider Office Phone # Fax #    [facility] [phone]      Your next 10 appointments already scheduled     Jun 11, 2018  2:30 PM CDT   Lab with  LAB   [facility] ([facility])    [address]  1st Floor  [facility] [address] [phone]            Jun 11, 2018  3:30 PM CDT   (Arrive by 3:15 PM)   Return General Liver with Lise Boss MD   [facility] Hepatology ([facility])    [address]  [address]  [facility] [address] [phone]              Further instructions from your care team       Discharge Instructions after Colonoscopy  or Sigmoidoscopy    Today you had a __x__ Colonoscopy     Activity and Diet  You were given medicine for pain. You may be dizzy or sleepy.  For 24 hours:    Do not drive or use heavy equipment.    Do not make important decisions.    Do  not drink any alcohol.  You may return to your normal diet and medicines.    Discomfort    Air was placed in your colon during the exam in order to see it. Walking helps to pass the air.    You may take Tylenol (acetaminophen) for pain unless your doctor has told you not to.    Follow-up  ___x_ We took small tissue samples or polyps to study. Your doctor will call you with the results  within two weeks.    When to call:    Call right away if you have:    Unusual pain in belly or chest pain not relieved with passing air.    More than 1 to 2 Tablespoons of bleeding from your rectum.    Fever above 100.6  F (37.5  C).    If you have severe pain, bleeding, or shortness of breath, go to an emergency room.    If you have questions, call:  Monday to Friday, 7 a.m. to 4:30 p.m.  Endoscopy: [phone] (We may have to call you back)    After hours  Hospital: [phone] (Ask for the GI fellow on call)Discharge Instructions after  Upper Endoscopy (EGD)    Activity and Diet  You were given medicine for pain. You may be dizzy or sleepy.  For 24 hours:    Do not drive or use heavy equipment.    Do not make important decisions.    Do not drink any alcohol.  _x__ You may return to your regular diet.    Discomfort  You may have a sore throat for 2 to 3 days. It may help to:    Avoid hot liquids for 24 hours.    Use sore throat lozenges.    Gargle as needed with salt water up to 4 times a day. Mix 1 cup of warm water  with 1 teaspoon of salt. Do not swallow..    You may take Tylenol (acetaminophen) for pain unless your doctor has told you not to.      Follow-up  ___ We took small tissue samples for study. If you do not have a follow-up visit scheduled,  call your provider s office in 2 weeks for the results.    Other instructions________________________________________________________    When to call us:  Problems are rare. Call right away if you have:    Unusual throat pain or trouble swallowing    Unusual pain in belly or chest  "that is not relieved by belching or passing air    Black stools (tar-like looking bowel movement)    Temperature above 100.6  F. (37.5  C).    If you vomit blood or have severe pain, go to an emergency room.    If you have questions, call:  Monday to Friday, 7 a.m. to 4:30 p.m.: Endoscopy: [phone] (We may have to call you back)    After hours: Hospital: [phone] (Ask for the GI fellow on call)    Pending Results     No orders found from 4/10/2018 to 4/13/2018.            Admission Information     Date & Time Provider Department Dept. Phone    4/12/2018 Clifton Chaparro MD [facility], [place], Endoscopy [phone]      Your Vitals Were     Blood Pressure Pulse Respirations Height Weight Pulse Oximetry    122/82 95 25 1.803 m (5' 11\") 125.6 kg (277 lb) 94%    BMI (Body Mass Index)                   38.63 kg/m2           [employer] Information     [employer] gives you secure access to your electronic health record. If you see a primary care provider, you can also send messages to your care team and make appointments. If you have questions, please call your primary care clinic.  If you do not have a primary care provider, please call [phone] and they will assist you.        Care EveryWhere ID     This is your Care EveryWhere ID. This could be used by other organizations to access your [place] medical records  [id number]        Equal Access to Services     RYAN MARTINEZ AH: Hadii ronny granger hadanto Soobedali, waaxda luqadaha, qaybta kaalmada adeegyada, brady singh. So [facility] [phone].    ATENCIÓN: Si habla español, tiene a banda disposición servicios gratuitos de asistencia lingüística. Llame al [phone].    We comply with applicable federal civil rights laws and [place] laws. We do not discriminate on the basis of race, color, national origin, age, disability, sex, sexual orientation, or gender identity.               Review of your medicines      UNREVIEWED medicines. Ask your " doctor about these medicines        Dose / Directions    PROBIOTIC ACIDOPHILUS PO        Take by mouth daily   Refills:  0                Protect others around you: Learn how to safely use, store and throw away your medicines at www.disposemymeds.org.             Medication List: This is a list of all your medications and when to take them. Check marks below indicate your daily home schedule. Keep this list as a reference.      Medications           Morning Afternoon Evening Bedtime As Needed    PROBIOTIC ACIDOPHILUS PO   Take by mouth daily

## 2018-04-12 NOTE — ANESTHESIA POSTPROCEDURE EVALUATION
Patient: Amari Disla    Procedure(s):   - Wound Class: II-Clean Contaminated   - Wound Class: II-Clean Contaminated    Diagnosis:Colon & Egd with Mahgoub/DX:Lower abdominal pain /Golytely & egd preps mailed  Diagnosis Additional Information: No value filed.    Anesthesia Type:  MAC    Note:  Anesthesia Post Evaluation    Patient location during evaluation: PACU  Patient participation: Able to fully participate in evaluation  Level of consciousness: awake and alert  Pain management: adequate  Airway patency: patent  Cardiovascular status: acceptable  Respiratory status: acceptable  Hydration status: acceptable  PONV: none     Anesthetic complications: None          Last vitals:  Vitals:    04/12/18 1454 04/12/18 1455 04/12/18 1456   BP:      Pulse:      Resp: 16 14 25   SpO2: 94% 94% 94%         Electronically Signed By: Ellie Richardson MD  April 12, 2018  3:19 PM

## 2018-04-17 DIAGNOSIS — K29.80 DUODENITIS WITHOUT HEMORRHAGE: Primary | ICD-10-CM

## 2018-04-17 LAB — COPATH REPORT: NORMAL

## 2018-04-17 RX ORDER — OMEPRAZOLE 40 MG/1
40 CAPSULE, DELAYED RELEASE ORAL 2 TIMES DAILY
Qty: 60 CAPSULE | Refills: 1 | Status: SHIPPED | OUTPATIENT
Start: 2018-04-17 | End: 2018-06-16

## 2018-04-17 NOTE — ADDENDUM NOTE
Encounter addended by: Clifton Chaparro MD on: 4/17/2018  5:01 PM<BR>     Actions taken: Letter status changed

## 2018-06-06 DIAGNOSIS — R94.5 ABNORMAL RESULTS OF LIVER FUNCTION STUDIES: Primary | ICD-10-CM

## 2018-06-11 ENCOUNTER — OFFICE VISIT (OUTPATIENT)
Dept: GASTROENTEROLOGY | Facility: CLINIC | Age: 23
End: 2018-06-11
Attending: INTERNAL MEDICINE
Payer: COMMERCIAL

## 2018-06-11 VITALS
BODY MASS INDEX: 37.05 KG/M2 | TEMPERATURE: 99 F | HEART RATE: 67 BPM | DIASTOLIC BLOOD PRESSURE: 82 MMHG | OXYGEN SATURATION: 97 % | WEIGHT: 273.5 LBS | HEIGHT: 72 IN | SYSTOLIC BLOOD PRESSURE: 135 MMHG

## 2018-06-11 DIAGNOSIS — K76.0 FATTY LIVER: Primary | ICD-10-CM

## 2018-06-11 DIAGNOSIS — R94.5 ABNORMAL RESULTS OF LIVER FUNCTION STUDIES: ICD-10-CM

## 2018-06-11 LAB
ALBUMIN SERPL-MCNC: 4.5 G/DL (ref 3.4–5)
ALP SERPL-CCNC: 73 U/L (ref 40–150)
ALT SERPL W P-5'-P-CCNC: 139 U/L (ref 0–70)
ANION GAP SERPL CALCULATED.3IONS-SCNC: 8 MMOL/L (ref 3–14)
AST SERPL W P-5'-P-CCNC: 92 U/L (ref 0–45)
BILIRUB DIRECT SERPL-MCNC: 0.1 MG/DL (ref 0–0.2)
BILIRUB SERPL-MCNC: 0.5 MG/DL (ref 0.2–1.3)
BUN SERPL-MCNC: 8 MG/DL (ref 7–30)
CALCIUM SERPL-MCNC: 9.1 MG/DL (ref 8.5–10.1)
CHLORIDE SERPL-SCNC: 105 MMOL/L (ref 94–109)
CO2 SERPL-SCNC: 24 MMOL/L (ref 20–32)
CREAT SERPL-MCNC: 0.9 MG/DL (ref 0.66–1.25)
ERYTHROCYTE [DISTWIDTH] IN BLOOD BY AUTOMATED COUNT: 12.3 % (ref 10–15)
GFR SERPL CREATININE-BSD FRML MDRD: >90 ML/MIN/1.7M2
GLUCOSE SERPL-MCNC: 89 MG/DL (ref 70–99)
HCT VFR BLD AUTO: 48.3 % (ref 40–53)
HGB BLD-MCNC: 16.4 G/DL (ref 13.3–17.7)
INR PPP: 1.02 (ref 0.86–1.14)
MCH RBC QN AUTO: 29.2 PG (ref 26.5–33)
MCHC RBC AUTO-ENTMCNC: 34 G/DL (ref 31.5–36.5)
MCV RBC AUTO: 86 FL (ref 78–100)
PLATELET # BLD AUTO: 234 10E9/L (ref 150–450)
POTASSIUM SERPL-SCNC: 4 MMOL/L (ref 3.4–5.3)
PROT SERPL-MCNC: 8.1 G/DL (ref 6.8–8.8)
RBC # BLD AUTO: 5.61 10E12/L (ref 4.4–5.9)
SODIUM SERPL-SCNC: 138 MMOL/L (ref 133–144)
WBC # BLD AUTO: 6 10E9/L (ref 4–11)

## 2018-06-11 PROCEDURE — 80076 HEPATIC FUNCTION PANEL: CPT | Performed by: INTERNAL MEDICINE

## 2018-06-11 PROCEDURE — 80048 BASIC METABOLIC PNL TOTAL CA: CPT | Performed by: INTERNAL MEDICINE

## 2018-06-11 PROCEDURE — 85610 PROTHROMBIN TIME: CPT | Performed by: INTERNAL MEDICINE

## 2018-06-11 PROCEDURE — G0463 HOSPITAL OUTPT CLINIC VISIT: HCPCS | Mod: ZF

## 2018-06-11 PROCEDURE — 36415 COLL VENOUS BLD VENIPUNCTURE: CPT | Performed by: INTERNAL MEDICINE

## 2018-06-11 PROCEDURE — 85027 COMPLETE CBC AUTOMATED: CPT | Performed by: INTERNAL MEDICINE

## 2018-06-11 ASSESSMENT — PAIN SCALES - GENERAL: PAINLEVEL: NO PAIN (0)

## 2018-06-11 NOTE — MR AVS SNAPSHOT
"              After Visit Summary   6/11/2018    Amari Disla    MRN: [id number]           Patient Information     Date Of Birth          1995        Visit Information        Provider Department      6/11/2018 3:30 PM Lise Boss MD [facility] Hepatology        Today's Diagnoses     Fatty liver    -  1       Follow-ups after your visit        Who to contact     If you have questions or need follow up information about today's clinic visit or your schedule please contact [facility] HEPATOLOGY directly at [phone].  Normal or non-critical lab and imaging results will be communicated to you by [employer], letter or phone within 4 business days after the clinic has received the results. If you do not hear from us within 7 days, please contact the clinic through [employer] or phone. If you have a critical or abnormal lab result, we will notify you by phone as soon as possible.  Submit refill requests through [employer] or call your pharmacy and they will forward the refill request to us. Please allow 3 business days for your refill to be completed.          Additional Information About Your Visit        MyChart Information     [employer] gives you secure access to your electronic health record. If you see a primary care provider, you can also send messages to your care team and make appointments. If you have questions, please call your primary care clinic.  If you do not have a primary care provider, please call [phone] and they will assist you.        Care EveryWhere ID     This is your Care EveryWhere ID. This could be used by other organizations to access your [place] medical records  [id number]        Your Vitals Were     Pulse Temperature Height Pulse Oximetry BMI (Body Mass Index)       67 99  F (37.2  C) (Oral) 1.816 m (5' 11.5\") 97% 37.61 kg/m2        Blood Pressure from Last 3 Encounters:   06/11/18 135/82   04/12/18 132/85   03/06/18 139/73    Weight from Last 3 Encounters:   06/11/18 124.1 kg (273 lb 8 " oz)   04/12/18 125.6 kg (277 lb)   03/06/18 124.4 kg (274 lb 3.2 oz)              Today, you had the following     No orders found for display       Primary Care Provider Office Phone # Fax #    [facility] [phone]       [facility] [address]  [facility] [address]        Equal Access to Services     MILTON MARITNEZ : Hadii aad ku hadasho Soomaali, waaxda luqadaha, qaybta kaalmada adeegyada, waxay idiin hayaan adeeg kharash la'aan . So [facility] [phone].    ATENCIÓN: Si habla español, tiene a banda disposición servicios gratuitos de asistencia lingüística. Sheri al [phone].    We comply with applicable federal civil rights laws and [place] laws. We do not discriminate on the basis of race, color, national origin, age, disability, sex, sexual orientation, or gender identity.            Thank you!     Thank you for choosing [facility] HEPATOLOGY  for your care. Our goal is always to provide you with excellent care. Hearing back from our patients is one way we can continue to improve our services. Please take a few minutes to complete the written survey that you may receive in the mail after your visit with us. Thank you!             Your Updated Medication List - Protect others around you: Learn how to safely use, store and throw away your medicines at www.disposemymeds.org.          This list is accurate as of 6/11/18 11:59 PM.  Always use your most recent med list.                   Brand Name Dispense Instructions for use Diagnosis    omeprazole 40 MG capsule    priLOSEC    60 capsule    Take 1 capsule (40 mg) by mouth 2 times daily    Duodenitis without hemorrhage       PROBIOTIC ACIDOPHILUS PO      Take by mouth daily

## 2018-06-11 NOTE — NURSING NOTE
Chief Complaint   Patient presents with     RECHECK     Abnormal results of liver function studies   Pt roomed, vitals, meds, and allergies reviewed with pt. Pt ready for provider.  Nathaniel Ron, CMA

## 2018-06-11 NOTE — LETTER
6/11/2018      RE: Amari Disla  [address] [address] [facility] [address]       Hepatology Clinic note  Amari Disla   Date of Birth 1995  Date of Service 6/11/2018         Impression/plan:   Amari Disla is a 23 year old male with hepatosplenomegaly, evidence of fatty liver disease, and alcohol misuse.  He presents with his mother for follow-up.    Has mildly elevated liver enzymes hepatocellular pattern, although of overall trended down over the past few months.  He has normal synthetic function, no evidence of advanced liver disease or portal hypertension.    1.  Hepatosplenomegaly  - Based on his risk factors, he likely has contribution from both nonalcoholic fatty liver disease, and alcoholic fatty liver disease.  -Both of the above can explain his hepatosplenomegaly.  -His workup for other causes of liver disease has been negative for autoimmune markers, viral hepatitis, iron overload, alpha-1 antitrypsin deficiency, celiac disease.    2.  Fatty liver disease  - Spent the entire visit reviewing causes of fat in the liver, potential for complications including inflammation (WONG), fibrosis, cirrhosis, HCC, and possible need for liver transplantation.   - Encouraged him to focus on controlling his metabolic risk factors including glucose control, hyperlipidemia, and the importance of regular exercise and dietary modifications to result in ~ 7-10% weight loss. This offers the best chaWe worked on identifying goals and exploring strategies to help implement change.   Suggest they meet with dietitian.   I believe they would benefit greatly from wellness/health coaching, will explore availability of this option. nce to improve fibrosis.   - We worked on identifying goals and exploring strategies to help implement change.   - Suggest He meets with dietitian.   - I believe they would benefit greatly from wellness/health coaching, will explore availability of this option.     3.  Alcohol misuse  -  Encourage him to strongly discontinue all alcohol use.  -He appears confident in his ability to achieve this, I do not believe counseling or treatment are really needed.    RTC on as needed basis     Lise Boss MD  [facility] Transplant Hepatology clinic    -----------------------------------------------------       HPI:   Amari Disla is a 23 year old male with chronically elevated liver enzymes, evidence of fatty liver disease, alcohol misuse, who presents for follow-up of his liver health.    Please refer to prior clinic note for details of initial presentation.   Since his last clinic visit, he notes to be stable without new or acute problems.  Has stable energy, appetite, and notes some improvement in his bowel habits.    He is completed the lab work that we requested, along with review of his liver biopsy and previously completed MRI.    He continued to consume alcohol occasionally, his last drink was within the last month.  He recently turned 23 years old and did consume moderate amount of alcohol.  However denies issues with abuse, or that alcohol is a problem for him.  His mother confirms this and agrees with plan.    Otherwise, denies chest pain, shortness of breath, abdominal pain, nausea, vomiting fevers, chills, bleeding, jaundice, confusion, falls, rash, pruritis, leg swelling or abdominal distention. Has stable weight, appetite and bowel habits         Past Medical History:     Past Medical History:   Diagnosis Date     Abdominal pain      Anxiety             Past Surgical History:     Past Surgical History:   Procedure Laterality Date     APPENDECTOMY       CHOLECYSTECTOMY       COLONOSCOPY N/A 4/12/2018    Procedure: COMBINED COLONOSCOPY, SINGLE OR MULTIPLE BIOPSY/POLYPECTOMY BY BIOPSY;;  Surgeon: Clifton Chaparro MD;  Location:  GI     ESOPHAGOSCOPY, GASTROSCOPY, DUODENOSCOPY (EGD), COMBINED N/A 4/12/2018    Procedure: COMBINED ESOPHAGOSCOPY, GASTROSCOPY, DUODENOSCOPY  "(EGD), BIOPSY SINGLE OR MULTIPLE;;  Surgeon: Clifton Chaparro MD;  Location: [facility] GI     PE TUBES       TONSILLECTOMY              Medications:     Current Outpatient Prescriptions   Medication     Lactobacillus (PROBIOTIC ACIDOPHILUS PO)     No current facility-administered medications for this visit.             Allergies:   No Known Allergies         Social History:     Social History     Social History     Marital status: Single     Spouse name: N/A     Number of children: N/A     Years of education: N/A     Occupational History     Not on file.     Social History Main Topics     Smoking status: Passive Smoke Exposure - Never Smoker     Smokeless tobacco: Never Used      Comment: dad      Alcohol use No     Drug use: No     Sexual activity: Not on file     Other Topics Concern     Not on file     Social History Narrative            Family History:   Negative for chronic liver disease          Review of Systems:   10 points ROS was obtained and highlighted in the HPI, otherwise negative.          Physical Exam:   VS:  /82  Pulse 67  Temp 99  F (37.2  C) (Oral)  Ht 1.816 m (5' 11.5\")  Wt 124.1 kg (273 lb 8 oz)  SpO2 97%  BMI 37.61 kg/m2      Gen: A&Ox3, NAD  HEENT: non-icteric, oropharynx clear  CV: RRR  Lung: CTAB  Abd: soft, NT, ND, spleen and liver are enlarged  Ext: no edema, intact pulses.   Skin: No stigmata of chronic liver disease.   Neuro: no focal deficits, grossly intact, no asterixis   Psych: appropriate mood and affects         Data:   Reviewed in person and significant for:  Lab Results   Component Value Date    WBC 6.0 06/11/2018     Lab Results   Component Value Date    RBC 5.61 06/11/2018     Lab Results   Component Value Date    HGB 16.4 06/11/2018     Lab Results   Component Value Date    HCT 48.3 06/11/2018     No components found for: MCT  Lab Results   Component Value Date    MCV 86 06/11/2018     Lab Results   Component Value Date    MCH 29.2 06/11/2018     Lab Results "   Component Value Date    MCHC 34.0 06/11/2018     Lab Results   Component Value Date    RDW 12.3 06/11/2018     Lab Results   Component Value Date     06/11/2018     Last Basic Metabolic Panel:  Lab Results   Component Value Date     06/11/2018      Lab Results   Component Value Date    POTASSIUM 4.0 06/11/2018     Lab Results   Component Value Date    CHLORIDE 105 06/11/2018     Lab Results   Component Value Date    STEVE 9.1 06/11/2018     Lab Results   Component Value Date    CO2 24 06/11/2018     Lab Results   Component Value Date    BUN 8 06/11/2018     Lab Results   Component Value Date    CR 0.90 06/11/2018     Lab Results   Component Value Date    GLC 89 06/11/2018     Liver Function Studies -   Recent Labs   Lab Test  06/11/18   1418   PROTTOTAL  8.1   ALBUMIN  4.5   BILITOTAL  0.5   ALKPHOS  73   AST  92*   ALT  139*       Lise Boss MD

## 2018-06-22 NOTE — PROGRESS NOTES
Hepatology Clinic note  Amari Disla   Date of Birth 1995  Date of Service 6/11/2018         Impression/plan:   Amari Disla is a 23 year old male with hepatosplenomegaly, evidence of fatty liver disease, and alcohol misuse.  He presents with his mother for follow-up.    Has mildly elevated liver enzymes hepatocellular pattern, although of overall trended down over the past few months.  He has normal synthetic function, no evidence of advanced liver disease or portal hypertension.    1.  Hepatosplenomegaly  - Based on his risk factors, he likely has contribution from both nonalcoholic fatty liver disease, and alcoholic fatty liver disease.  -Both of the above can explain his hepatosplenomegaly.  -His workup for other causes of liver disease has been negative for autoimmune markers, viral hepatitis, iron overload, alpha-1 antitrypsin deficiency, celiac disease.    2.  Fatty liver disease  - Spent the entire visit reviewing causes of fat in the liver, potential for complications including inflammation (WONG), fibrosis, cirrhosis, HCC, and possible need for liver transplantation.   - Encouraged him to focus on controlling his metabolic risk factors including glucose control, hyperlipidemia, and the importance of regular exercise and dietary modifications to result in ~ 7-10% weight loss. This offers the best chaWe worked on identifying goals and exploring strategies to help implement change.   Suggest they meet with dietitian.   I believe they would benefit greatly from wellness/health coaching, will explore availability of this option. nce to improve fibrosis.   - We worked on identifying goals and exploring strategies to help implement change.   - Suggest He meets with dietitian.   - I believe they would benefit greatly from wellness/health coaching, will explore availability of this option.     3.  Alcohol misuse  - Encourage him to strongly discontinue all alcohol use.  -He appears confident in his ability  to achieve this, I do not believe counseling or treatment are really needed.    RTC on as needed basis     Lise Boss MD  [facility] Hepatology [facility]    -----------------------------------------------------       HPI:   Amari Disla is a 23 year old male with chronically elevated liver enzymes, evidence of fatty liver disease, alcohol misuse, who presents for follow-up of his liver health.    Please refer to prior clinic note for details of initial presentation.   Since his last clinic visit, he notes to be stable without new or acute problems.  Has stable energy, appetite, and notes some improvement in his bowel habits.    He is completed the lab work that we requested, along with review of his liver biopsy and previously completed MRI.    He continued to consume alcohol occasionally, his last drink was within the last month.  He recently turned 23 years old and did consume moderate amount of alcohol.  However denies issues with abuse, or that alcohol is a problem for him.  His mother confirms this and agrees with plan.    Otherwise, denies chest pain, shortness of breath, abdominal pain, nausea, vomiting fevers, chills, bleeding, jaundice, confusion, falls, rash, pruritis, leg swelling or abdominal distention. Has stable weight, appetite and bowel habits         Past Medical History:     Past Medical History:   Diagnosis Date     Abdominal pain      Anxiety             Past Surgical History:     Past Surgical History:   Procedure Laterality Date     APPENDECTOMY       CHOLECYSTECTOMY       COLONOSCOPY N/A 4/12/2018    Procedure: COMBINED COLONOSCOPY, SINGLE OR MULTIPLE BIOPSY/POLYPECTOMY BY BIOPSY;;  Surgeon: Clifton Chaparro MD;  Location: [facility]     ESOPHAGOSCOPY, GASTROSCOPY, DUODENOSCOPY (EGD), COMBINED N/A 4/12/2018    Procedure: COMBINED ESOPHAGOSCOPY, GASTROSCOPY, DUODENOSCOPY (EGD), BIOPSY SINGLE OR MULTIPLE;;  Surgeon: Clifton Chaparro MD;  Location: [facility]     PE  "TUBES       TONSILLECTOMY              Medications:     Current Outpatient Prescriptions   Medication     Lactobacillus (PROBIOTIC ACIDOPHILUS PO)     No current facility-administered medications for this visit.             Allergies:   No Known Allergies         Social History:     Social History     Social History     Marital status: Single     Spouse name: N/A     Number of children: N/A     Years of education: N/A     Occupational History     Not on file.     Social History Main Topics     Smoking status: Passive Smoke Exposure - Never Smoker     Smokeless tobacco: Never Used      Comment: dad      Alcohol use No     Drug use: No     Sexual activity: Not on file     Other Topics Concern     Not on file     Social History Narrative            Family History:   Negative for chronic liver disease          Review of Systems:   10 points ROS was obtained and highlighted in the HPI, otherwise negative.          Physical Exam:   VS:  /82  Pulse 67  Temp 99  F (37.2  C) (Oral)  Ht 1.816 m (5' 11.5\")  Wt 124.1 kg (273 lb 8 oz)  SpO2 97%  BMI 37.61 kg/m2      Gen: A&Ox3, NAD  HEENT: non-icteric, oropharynx clear  CV: RRR  Lung: CTAB  Abd: soft, NT, ND, spleen and liver are enlarged  Ext: no edema, intact pulses.   Skin: No stigmata of chronic liver disease.   Neuro: no focal deficits, grossly intact, no asterixis   Psych: appropriate mood and affects         Data:   Reviewed in person and significant for:  Lab Results   Component Value Date    WBC 6.0 06/11/2018     Lab Results   Component Value Date    RBC 5.61 06/11/2018     Lab Results   Component Value Date    HGB 16.4 06/11/2018     Lab Results   Component Value Date    HCT 48.3 06/11/2018     No components found for: MCT  Lab Results   Component Value Date    MCV 86 06/11/2018     Lab Results   Component Value Date    MCH 29.2 06/11/2018     Lab Results   Component Value Date    MCHC 34.0 06/11/2018     Lab Results   Component Value Date    RDW 12.3 " 06/11/2018     Lab Results   Component Value Date     06/11/2018     Last Basic Metabolic Panel:  Lab Results   Component Value Date     06/11/2018      Lab Results   Component Value Date    POTASSIUM 4.0 06/11/2018     Lab Results   Component Value Date    CHLORIDE 105 06/11/2018     Lab Results   Component Value Date    STEVE 9.1 06/11/2018     Lab Results   Component Value Date    CO2 24 06/11/2018     Lab Results   Component Value Date    BUN 8 06/11/2018     Lab Results   Component Value Date    CR 0.90 06/11/2018     Lab Results   Component Value Date    GLC 89 06/11/2018     Liver Function Studies -   Recent Labs   Lab Test  06/11/18   1418   PROTTOTAL  8.1   ALBUMIN  4.5   BILITOTAL  0.5   ALKPHOS  73   AST  92*   ALT  139*

## 2019-10-03 ENCOUNTER — HEALTH MAINTENANCE LETTER (OUTPATIENT)
Age: 24
End: 2019-10-03

## 2020-11-07 ENCOUNTER — HEALTH MAINTENANCE LETTER (OUTPATIENT)
Age: 25
End: 2020-11-07

## 2021-09-05 ENCOUNTER — HEALTH MAINTENANCE LETTER (OUTPATIENT)
Age: 26
End: 2021-09-05

## 2021-12-26 ENCOUNTER — HEALTH MAINTENANCE LETTER (OUTPATIENT)
Age: 26
End: 2021-12-26

## 2022-10-23 ENCOUNTER — HEALTH MAINTENANCE LETTER (OUTPATIENT)
Age: 27
End: 2022-10-23

## 2023-04-02 ENCOUNTER — HEALTH MAINTENANCE LETTER (OUTPATIENT)
Age: 28
End: 2023-04-02

## 2025-03-18 NOTE — TELEPHONE ENCOUNTER
Patient scheduled for EGD and colonoscopy     Indication for procedure. Lower abdominal pain     Referring Provider. Carl Rueda    ? No     Arrival time verified? Patient instructed to arrive at 2:00 pm    Facility location verified? [address], [address].     Instructions given regarding prep and procedure. Transportation policy reviewed and verbalized understanding. Denied prep review.     Prep Type golytely.     Are you taking any anticoagulants or blood thinners? Denies     Instructions given? Yes     Electronic implanted devices? Denies     Pre procedure teaching completed? Yes    Transportation from procedure? Mom will drive     H&P / Pre op physical completed? N/A    Omar Hager RN               6 (moderate pain)

## (undated) RX ORDER — SIMETHICONE 20 MG/.3ML
EMULSION ORAL
Status: DISPENSED
Start: 2018-04-12

## (undated) RX ORDER — PROPOFOL 10 MG/ML
INJECTION, EMULSION INTRAVENOUS
Status: DISPENSED
Start: 2018-04-12

## (undated) RX ORDER — PHENYLEPHRINE HCL IN 0.9% NACL 1 MG/10 ML
SYRINGE (ML) INTRAVENOUS
Status: DISPENSED
Start: 2018-04-12

## (undated) RX ORDER — LIDOCAINE HYDROCHLORIDE 20 MG/ML
INJECTION, SOLUTION EPIDURAL; INFILTRATION; INTRACAUDAL; PERINEURAL
Status: DISPENSED
Start: 2018-04-12

## (undated) RX ORDER — FENTANYL CITRATE 50 UG/ML
INJECTION, SOLUTION INTRAMUSCULAR; INTRAVENOUS
Status: DISPENSED
Start: 2018-04-12